# Patient Record
Sex: FEMALE | Race: WHITE | Employment: FULL TIME | URBAN - METROPOLITAN AREA
[De-identification: names, ages, dates, MRNs, and addresses within clinical notes are randomized per-mention and may not be internally consistent; named-entity substitution may affect disease eponyms.]

---

## 2022-07-23 ENCOUNTER — ANESTHESIA (OUTPATIENT)
Dept: OPERATING ROOM | Age: 47
DRG: 560 | End: 2022-07-23
Payer: COMMERCIAL

## 2022-07-23 ENCOUNTER — HOSPITAL ENCOUNTER (INPATIENT)
Age: 47
LOS: 2 days | Discharge: HOME | DRG: 560 | End: 2022-07-25
Attending: ORTHOPAEDIC SURGERY | Admitting: ORTHOPAEDIC SURGERY
Payer: COMMERCIAL

## 2022-07-23 ENCOUNTER — ANESTHESIA EVENT (OUTPATIENT)
Dept: OPERATING ROOM | Age: 47
DRG: 560 | End: 2022-07-23
Payer: COMMERCIAL

## 2022-07-23 ENCOUNTER — APPOINTMENT (OUTPATIENT)
Dept: GENERAL RADIOLOGY | Age: 47
DRG: 560 | End: 2022-07-23
Payer: COMMERCIAL

## 2022-07-23 DIAGNOSIS — M24.351: Primary | ICD-10-CM

## 2022-07-23 PROBLEM — S73.004A HIP DISLOCATION, RIGHT, INITIAL ENCOUNTER (HCC): Status: ACTIVE | Noted: 2022-07-23

## 2022-07-23 LAB
ANION GAP SERPL CALCULATED.3IONS-SCNC: 12 MEQ/L (ref 9–15)
BASOPHILS ABSOLUTE: 0 K/UL (ref 0–0.2)
BASOPHILS RELATIVE PERCENT: 0.1 %
BUN BLDV-MCNC: 13 MG/DL (ref 6–20)
CALCIUM SERPL-MCNC: 8.9 MG/DL (ref 8.5–9.9)
CHLORIDE BLD-SCNC: 103 MEQ/L (ref 95–107)
CO2: 23 MEQ/L (ref 20–31)
CREAT SERPL-MCNC: 0.51 MG/DL (ref 0.5–0.9)
EOSINOPHILS ABSOLUTE: 0 K/UL (ref 0–0.7)
EOSINOPHILS RELATIVE PERCENT: 0 %
GFR AFRICAN AMERICAN: >60
GFR NON-AFRICAN AMERICAN: >60
GLUCOSE BLD-MCNC: 121 MG/DL (ref 70–99)
HCT VFR BLD CALC: 37.2 % (ref 37–47)
HEMOGLOBIN: 12.2 G/DL (ref 12–16)
LYMPHOCYTES ABSOLUTE: 0.6 K/UL (ref 1–4.8)
LYMPHOCYTES RELATIVE PERCENT: 7.8 %
MCH RBC QN AUTO: 30 PG (ref 27–31.3)
MCHC RBC AUTO-ENTMCNC: 32.7 % (ref 33–37)
MCV RBC AUTO: 91.9 FL (ref 82–100)
MONOCYTES ABSOLUTE: 0.1 K/UL (ref 0.2–0.8)
MONOCYTES RELATIVE PERCENT: 1.5 %
NEUTROPHILS ABSOLUTE: 6.5 K/UL (ref 1.4–6.5)
NEUTROPHILS RELATIVE PERCENT: 90.6 %
PDW BLD-RTO: 14.1 % (ref 11.5–14.5)
PLATELET # BLD: 391 K/UL (ref 130–400)
POTASSIUM REFLEX MAGNESIUM: 4.3 MEQ/L (ref 3.4–4.9)
RBC # BLD: 4.05 M/UL (ref 4.2–5.4)
SODIUM BLD-SCNC: 138 MEQ/L (ref 135–144)
WBC # BLD: 7.1 K/UL (ref 4.8–10.8)

## 2022-07-23 PROCEDURE — 6370000000 HC RX 637 (ALT 250 FOR IP): Performed by: STUDENT IN AN ORGANIZED HEALTH CARE EDUCATION/TRAINING PROGRAM

## 2022-07-23 PROCEDURE — G0378 HOSPITAL OBSERVATION PER HR: HCPCS

## 2022-07-23 PROCEDURE — 0QS6XZZ REPOSITION RIGHT UPPER FEMUR, EXTERNAL APPROACH: ICD-10-PCS | Performed by: ORTHOPAEDIC SURGERY

## 2022-07-23 PROCEDURE — 3600000002 HC SURGERY LEVEL 2 BASE: Performed by: ORTHOPAEDIC SURGERY

## 2022-07-23 PROCEDURE — 6360000002 HC RX W HCPCS: Performed by: STUDENT IN AN ORGANIZED HEALTH CARE EDUCATION/TRAINING PROGRAM

## 2022-07-23 PROCEDURE — 80048 BASIC METABOLIC PNL TOTAL CA: CPT

## 2022-07-23 PROCEDURE — 6360000002 HC RX W HCPCS: Performed by: ANESTHESIOLOGY

## 2022-07-23 PROCEDURE — 3700000001 HC ADD 15 MINUTES (ANESTHESIA): Performed by: ORTHOPAEDIC SURGERY

## 2022-07-23 PROCEDURE — 2580000003 HC RX 258: Performed by: ANESTHESIOLOGY

## 2022-07-23 PROCEDURE — 85025 COMPLETE CBC W/AUTO DIFF WBC: CPT

## 2022-07-23 PROCEDURE — 6370000000 HC RX 637 (ALT 250 FOR IP): Performed by: ORTHOPAEDIC SURGERY

## 2022-07-23 PROCEDURE — 7100000000 HC PACU RECOVERY - FIRST 15 MIN: Performed by: ORTHOPAEDIC SURGERY

## 2022-07-23 PROCEDURE — 7100000001 HC PACU RECOVERY - ADDTL 15 MIN: Performed by: ORTHOPAEDIC SURGERY

## 2022-07-23 PROCEDURE — 2500000003 HC RX 250 WO HCPCS: Performed by: STUDENT IN AN ORGANIZED HEALTH CARE EDUCATION/TRAINING PROGRAM

## 2022-07-23 PROCEDURE — 73552 X-RAY EXAM OF FEMUR 2/>: CPT

## 2022-07-23 PROCEDURE — 99285 EMERGENCY DEPT VISIT HI MDM: CPT

## 2022-07-23 PROCEDURE — 36415 COLL VENOUS BLD VENIPUNCTURE: CPT

## 2022-07-23 PROCEDURE — 73501 X-RAY EXAM HIP UNI 1 VIEW: CPT

## 2022-07-23 PROCEDURE — 73502 X-RAY EXAM HIP UNI 2-3 VIEWS: CPT

## 2022-07-23 PROCEDURE — 96372 THER/PROPH/DIAG INJ SC/IM: CPT

## 2022-07-23 PROCEDURE — 3209999900 FLUORO FOR SURGICAL PROCEDURES

## 2022-07-23 PROCEDURE — 3700000000 HC ANESTHESIA ATTENDED CARE: Performed by: ORTHOPAEDIC SURGERY

## 2022-07-23 PROCEDURE — 3600000012 HC SURGERY LEVEL 2 ADDTL 15MIN: Performed by: ORTHOPAEDIC SURGERY

## 2022-07-23 PROCEDURE — 1210000000 HC MED SURG R&B

## 2022-07-23 PROCEDURE — 6360000002 HC RX W HCPCS: Performed by: ORTHOPAEDIC SURGERY

## 2022-07-23 PROCEDURE — 2580000003 HC RX 258: Performed by: ORTHOPAEDIC SURGERY

## 2022-07-23 RX ORDER — METOCLOPRAMIDE HYDROCHLORIDE 5 MG/ML
10 INJECTION INTRAMUSCULAR; INTRAVENOUS
Status: DISCONTINUED | OUTPATIENT
Start: 2022-07-23 | End: 2022-07-23 | Stop reason: HOSPADM

## 2022-07-23 RX ORDER — SUCCINYLCHOLINE CHLORIDE 20 MG/ML
INJECTION INTRAMUSCULAR; INTRAVENOUS PRN
Status: DISCONTINUED | OUTPATIENT
Start: 2022-07-23 | End: 2022-07-23 | Stop reason: SDUPTHER

## 2022-07-23 RX ORDER — FENTANYL CITRATE 50 UG/ML
INJECTION, SOLUTION INTRAMUSCULAR; INTRAVENOUS PRN
Status: DISCONTINUED | OUTPATIENT
Start: 2022-07-23 | End: 2022-07-23 | Stop reason: SDUPTHER

## 2022-07-23 RX ORDER — OXYCODONE HYDROCHLORIDE 5 MG/1
5 TABLET ORAL EVERY 4 HOURS PRN
Status: DISCONTINUED | OUTPATIENT
Start: 2022-07-23 | End: 2022-07-25 | Stop reason: HOSPADM

## 2022-07-23 RX ORDER — SODIUM CHLORIDE, SODIUM LACTATE, POTASSIUM CHLORIDE, CALCIUM CHLORIDE 600; 310; 30; 20 MG/100ML; MG/100ML; MG/100ML; MG/100ML
INJECTION, SOLUTION INTRAVENOUS CONTINUOUS PRN
Status: DISCONTINUED | OUTPATIENT
Start: 2022-07-23 | End: 2022-07-23 | Stop reason: SDUPTHER

## 2022-07-23 RX ORDER — MIDAZOLAM HYDROCHLORIDE 1 MG/ML
INJECTION INTRAMUSCULAR; INTRAVENOUS PRN
Status: DISCONTINUED | OUTPATIENT
Start: 2022-07-23 | End: 2022-07-23 | Stop reason: SDUPTHER

## 2022-07-23 RX ORDER — MORPHINE SULFATE 2 MG/ML
2 INJECTION, SOLUTION INTRAMUSCULAR; INTRAVENOUS
Status: DISCONTINUED | OUTPATIENT
Start: 2022-07-23 | End: 2022-07-25 | Stop reason: HOSPADM

## 2022-07-23 RX ORDER — PREDNISONE 20 MG/1
40 TABLET ORAL ONCE
Status: COMPLETED | OUTPATIENT
Start: 2022-07-23 | End: 2022-07-23

## 2022-07-23 RX ORDER — SODIUM CHLORIDE 0.9 % (FLUSH) 0.9 %
5-40 SYRINGE (ML) INJECTION PRN
Status: DISCONTINUED | OUTPATIENT
Start: 2022-07-23 | End: 2022-07-25 | Stop reason: HOSPADM

## 2022-07-23 RX ORDER — FENTANYL CITRATE 50 UG/ML
25 INJECTION, SOLUTION INTRAMUSCULAR; INTRAVENOUS EVERY 5 MIN PRN
Status: DISCONTINUED | OUTPATIENT
Start: 2022-07-23 | End: 2022-07-23 | Stop reason: HOSPADM

## 2022-07-23 RX ORDER — MORPHINE SULFATE 4 MG/ML
4 INJECTION, SOLUTION INTRAMUSCULAR; INTRAVENOUS
Status: DISCONTINUED | OUTPATIENT
Start: 2022-07-23 | End: 2022-07-25 | Stop reason: HOSPADM

## 2022-07-23 RX ORDER — HYDRALAZINE HYDROCHLORIDE 20 MG/ML
10 INJECTION INTRAMUSCULAR; INTRAVENOUS
Status: DISCONTINUED | OUTPATIENT
Start: 2022-07-23 | End: 2022-07-23 | Stop reason: HOSPADM

## 2022-07-23 RX ORDER — PROPOFOL 10 MG/ML
INJECTION, EMULSION INTRAVENOUS PRN
Status: DISCONTINUED | OUTPATIENT
Start: 2022-07-23 | End: 2022-07-23 | Stop reason: SDUPTHER

## 2022-07-23 RX ORDER — SODIUM CHLORIDE 9 MG/ML
25 INJECTION, SOLUTION INTRAVENOUS PRN
Status: DISCONTINUED | OUTPATIENT
Start: 2022-07-23 | End: 2022-07-23 | Stop reason: HOSPADM

## 2022-07-23 RX ORDER — CYCLOBENZAPRINE HCL 10 MG
TABLET ORAL
COMMUNITY
Start: 2022-06-28

## 2022-07-23 RX ORDER — ONDANSETRON 2 MG/ML
4 INJECTION INTRAMUSCULAR; INTRAVENOUS EVERY 6 HOURS PRN
Status: DISCONTINUED | OUTPATIENT
Start: 2022-07-23 | End: 2022-07-25 | Stop reason: HOSPADM

## 2022-07-23 RX ORDER — ONDANSETRON 4 MG/1
4 TABLET, ORALLY DISINTEGRATING ORAL EVERY 8 HOURS PRN
Status: DISCONTINUED | OUTPATIENT
Start: 2022-07-23 | End: 2022-07-25 | Stop reason: HOSPADM

## 2022-07-23 RX ORDER — SODIUM CHLORIDE, SODIUM LACTATE, POTASSIUM CHLORIDE, CALCIUM CHLORIDE 600; 310; 30; 20 MG/100ML; MG/100ML; MG/100ML; MG/100ML
INJECTION, SOLUTION INTRAVENOUS
Status: COMPLETED
Start: 2022-07-23 | End: 2022-07-23

## 2022-07-23 RX ORDER — LABETALOL HYDROCHLORIDE 5 MG/ML
10 INJECTION, SOLUTION INTRAVENOUS
Status: DISCONTINUED | OUTPATIENT
Start: 2022-07-23 | End: 2022-07-23 | Stop reason: HOSPADM

## 2022-07-23 RX ORDER — KETOROLAC TROMETHAMINE 30 MG/ML
30 INJECTION, SOLUTION INTRAMUSCULAR; INTRAVENOUS ONCE
Status: COMPLETED | OUTPATIENT
Start: 2022-07-23 | End: 2022-07-23

## 2022-07-23 RX ORDER — LIDOCAINE HYDROCHLORIDE 20 MG/ML
INJECTION, SOLUTION INTRAVENOUS PRN
Status: DISCONTINUED | OUTPATIENT
Start: 2022-07-23 | End: 2022-07-23 | Stop reason: SDUPTHER

## 2022-07-23 RX ORDER — SODIUM CHLORIDE 0.9 % (FLUSH) 0.9 %
5-40 SYRINGE (ML) INJECTION EVERY 12 HOURS SCHEDULED
Status: DISCONTINUED | OUTPATIENT
Start: 2022-07-23 | End: 2022-07-25 | Stop reason: HOSPADM

## 2022-07-23 RX ORDER — PANTOPRAZOLE SODIUM 40 MG/1
TABLET, DELAYED RELEASE ORAL
Status: ON HOLD | COMMUNITY
Start: 2022-06-28 | End: 2022-07-23

## 2022-07-23 RX ORDER — DOCUSATE SODIUM 100 MG/1
CAPSULE, LIQUID FILLED ORAL
COMMUNITY
Start: 2022-06-28

## 2022-07-23 RX ORDER — ASPIRIN 81 MG/1
81 TABLET, CHEWABLE ORAL DAILY
COMMUNITY
Start: 2022-06-28

## 2022-07-23 RX ORDER — ETOMIDATE 2 MG/ML
16 INJECTION INTRAVENOUS ONCE
Status: COMPLETED | OUTPATIENT
Start: 2022-07-23 | End: 2022-07-23

## 2022-07-23 RX ORDER — HYDROCHLOROTHIAZIDE 25 MG/1
TABLET ORAL
COMMUNITY
Start: 2022-06-01

## 2022-07-23 RX ORDER — SODIUM CHLORIDE 9 MG/ML
INJECTION, SOLUTION INTRAVENOUS PRN
Status: DISCONTINUED | OUTPATIENT
Start: 2022-07-23 | End: 2022-07-25 | Stop reason: HOSPADM

## 2022-07-23 RX ORDER — SODIUM CHLORIDE 0.9 % (FLUSH) 0.9 %
5-40 SYRINGE (ML) INJECTION PRN
Status: DISCONTINUED | OUTPATIENT
Start: 2022-07-23 | End: 2022-07-23 | Stop reason: HOSPADM

## 2022-07-23 RX ORDER — OXYCODONE HYDROCHLORIDE 5 MG/1
5 TABLET ORAL
Status: DISCONTINUED | OUTPATIENT
Start: 2022-07-23 | End: 2022-07-23 | Stop reason: HOSPADM

## 2022-07-23 RX ORDER — OXYCODONE HYDROCHLORIDE AND ACETAMINOPHEN 5; 325 MG/1; MG/1
1 TABLET ORAL ONCE
Status: COMPLETED | OUTPATIENT
Start: 2022-07-23 | End: 2022-07-23

## 2022-07-23 RX ORDER — SODIUM CHLORIDE 0.9 % (FLUSH) 0.9 %
5-40 SYRINGE (ML) INJECTION EVERY 12 HOURS SCHEDULED
Status: DISCONTINUED | OUTPATIENT
Start: 2022-07-23 | End: 2022-07-23 | Stop reason: HOSPADM

## 2022-07-23 RX ORDER — ONDANSETRON 2 MG/ML
4 INJECTION INTRAMUSCULAR; INTRAVENOUS
Status: DISCONTINUED | OUTPATIENT
Start: 2022-07-23 | End: 2022-07-23 | Stop reason: HOSPADM

## 2022-07-23 RX ADMIN — SODIUM CHLORIDE, POTASSIUM CHLORIDE, SODIUM LACTATE AND CALCIUM CHLORIDE: 600; 310; 30; 20 INJECTION, SOLUTION INTRAVENOUS at 09:29

## 2022-07-23 RX ADMIN — PREDNISONE 40 MG: 20 TABLET ORAL at 04:00

## 2022-07-23 RX ADMIN — PROPOFOL 200 MG: 10 INJECTION, EMULSION INTRAVENOUS at 09:33

## 2022-07-23 RX ADMIN — MIDAZOLAM HYDROCHLORIDE 2 MG: 1 INJECTION, SOLUTION INTRAMUSCULAR; INTRAVENOUS at 09:29

## 2022-07-23 RX ADMIN — SUCCINYLCHOLINE CHLORIDE 160 MG: 20 INJECTION, SOLUTION INTRAMUSCULAR; INTRAVENOUS at 09:33

## 2022-07-23 RX ADMIN — ASPIRIN 325 MG: 325 TABLET, COATED ORAL at 20:16

## 2022-07-23 RX ADMIN — OXYCODONE 5 MG: 5 TABLET ORAL at 16:26

## 2022-07-23 RX ADMIN — KETOROLAC TROMETHAMINE 30 MG: 30 INJECTION, SOLUTION INTRAMUSCULAR; INTRAVENOUS at 03:59

## 2022-07-23 RX ADMIN — SODIUM CHLORIDE, PRESERVATIVE FREE 10 ML: 5 INJECTION INTRAVENOUS at 20:17

## 2022-07-23 RX ADMIN — ONDANSETRON 4 MG: 2 INJECTION INTRAMUSCULAR; INTRAVENOUS at 06:51

## 2022-07-23 RX ADMIN — ETOMIDATE 16 MG: 2 INJECTION, SOLUTION INTRAVENOUS at 06:55

## 2022-07-23 RX ADMIN — OXYCODONE AND ACETAMINOPHEN 1 TABLET: 5; 325 TABLET ORAL at 04:00

## 2022-07-23 RX ADMIN — FENTANYL CITRATE 50 MCG: 50 INJECTION, SOLUTION INTRAMUSCULAR; INTRAVENOUS at 09:29

## 2022-07-23 RX ADMIN — MORPHINE SULFATE 4 MG: 4 INJECTION, SOLUTION INTRAMUSCULAR; INTRAVENOUS at 06:51

## 2022-07-23 RX ADMIN — OXYCODONE 5 MG: 5 TABLET ORAL at 11:11

## 2022-07-23 RX ADMIN — LIDOCAINE HYDROCHLORIDE 60 MG: 20 INJECTION, SOLUTION INTRAVENOUS at 09:33

## 2022-07-23 ASSESSMENT — ENCOUNTER SYMPTOMS
BACK PAIN: 0
SHORTNESS OF BREATH: 0
EYE PAIN: 0
SORE THROAT: 0
CHEST TIGHTNESS: 0
DIARRHEA: 0
NAUSEA: 0

## 2022-07-23 ASSESSMENT — PAIN SCALES - GENERAL
PAINLEVEL_OUTOF10: 10
PAINLEVEL_OUTOF10: 10
PAINLEVEL_OUTOF10: 7
PAINLEVEL_OUTOF10: 7
PAINLEVEL_OUTOF10: 2
PAINLEVEL_OUTOF10: 0
PAINLEVEL_OUTOF10: 0
PAINLEVEL_OUTOF10: 10

## 2022-07-23 ASSESSMENT — PAIN DESCRIPTION - ORIENTATION
ORIENTATION: RIGHT

## 2022-07-23 ASSESSMENT — PAIN DESCRIPTION - ONSET
ONSET: SUDDEN
ONSET: SUDDEN

## 2022-07-23 ASSESSMENT — PAIN DESCRIPTION - LOCATION
LOCATION: HIP
LOCATION: HIP
LOCATION: LEG;HIP
LOCATION: HIP;LEG
LOCATION: HIP

## 2022-07-23 ASSESSMENT — PAIN DESCRIPTION - FREQUENCY
FREQUENCY: CONTINUOUS
FREQUENCY: CONTINUOUS

## 2022-07-23 ASSESSMENT — PAIN DESCRIPTION - PAIN TYPE
TYPE: ACUTE PAIN
TYPE: ACUTE PAIN

## 2022-07-23 ASSESSMENT — PAIN DESCRIPTION - DESCRIPTORS
DESCRIPTORS: ACHING
DESCRIPTORS: SHARP;THROBBING;TINGLING
DESCRIPTORS: ACHING
DESCRIPTORS: ACHING

## 2022-07-23 ASSESSMENT — PAIN - FUNCTIONAL ASSESSMENT
PAIN_FUNCTIONAL_ASSESSMENT: INTOLERABLE, UNABLE TO DO ANY ACTIVE OR PASSIVE ACTIVITIES
PAIN_FUNCTIONAL_ASSESSMENT: 0-10

## 2022-07-23 NOTE — ANESTHESIA POSTPROCEDURE EVALUATION
Department of Anesthesiology  Postprocedure Note    Patient: Vazquez Frederick  MRN: [id number]  [place]: 1975  Date of evaluation: 7/23/2022      Procedure Summary     Date: 07/23/22 Room / Location: [address]    Anesthesia Start: 5327 Anesthesia Stop: 6873    Procedure: HIP CLOSED REDUCTION (Right) Diagnosis:       Closed dislocation of right hip, initial encounter ([facility]      (Closed dislocation of right hip, initial encounter ([facility] Vivian Race)    Surgeons: Olivia Cobb MD Responsible Provider: Juli Alvares MD    Anesthesia Type: General ASA Status: 3          Anesthesia Type: General    Ralf Phase I:      Ralf Phase II:        Anesthesia Post Evaluation    Patient location during evaluation: bedside  Patient participation: complete - patient participated  Level of consciousness: awake and awake and alert  Airway patency: patent  Nausea & Vomiting: no nausea and no vomiting  Complications: no  Cardiovascular status: blood pressure returned to baseline and hemodynamically stable  Respiratory status: acceptable  Hydration status: euvolemic

## 2022-07-23 NOTE — ED NOTES
Pt update don resulted images. Plan of care is discussed. Pt verbalized understanding.       Pauline Griffin RN  07/23/22 3111

## 2022-07-23 NOTE — SEDATION DOCUMENTATION
Pt A/O x4  Resting comfortably in bed  O2 supplementation removed, pt SpO2 95% on RA  Remains on cardiac monitor with cycling BP

## 2022-07-23 NOTE — ED PROVIDER NOTES
[facility] ED  eMERGENCYdEPARTMENT eNCOUnter      Pt Name: Marty Thomas  MRN: [id number]  [place] 1975  Date of evaluation: 2022  Provider:Ko Wray PA-C    CHIEF COMPLAINT           HPI  Marty Thomas is a 51 y.o. female per chart review has a h/o Raynaud's, depression, bipolar, radiculopathy, occipital neuralgia presents with hip pain. Patient is status post right hip replacement 3 weeks ago by Dr. Singh Kingsley at [facility] AT [facility]. She states she was just lying on her floor when she experienced sharp, severe, radiating down the buttock and down the leg hip pain earlier today. Patient denies any acute movements or injuries to the area. She denies fever, chills, urinary symptoms, low back pain. ROS  Review of Systems   Constitutional:  Negative for chills, fatigue and fever. HENT:  Negative for ear pain, hearing loss and sore throat. Eyes:  Negative for pain and visual disturbance. Respiratory:  Negative for chest tightness and shortness of breath. Cardiovascular:  Negative for chest pain. Gastrointestinal:  Negative for diarrhea and nausea. Endocrine: Negative for cold intolerance. Genitourinary:  Negative for hematuria. Musculoskeletal:  Negative for back pain. Right hip pain   Skin:  Negative for rash and wound. Neurological:  Negative for dizziness and headaches. Psychiatric/Behavioral:  Negative for behavioral problems and confusion. Except as noted above the remainder of the review of systems was reviewed and negative.        PAST MEDICAL HISTORY     Past Medical History:   Diagnosis Date    Anxiety     Depression     Migraine headache     Obesity     Raynaud disease     Tobacco abuse          SURGICAL HISTORY       Past Surgical History:   Procedure Laterality Date    CARDIAC CATHETERIZATION      MINIMAL CAD-PRICE-failed stress w CP     SECTION      HYSTERECTOMY (CERVIX STATUS UNKNOWN)  10/31/2011    Dr. Cervantes  VEIN SURGERY  2010    [facility]         CURRENTMEDICATIONS       Previous Medications    ASPIRIN 81 MG CHEWABLE TABLET    Take 81 mg by mouth in the morning. CYCLOBENZAPRINE (FLEXERIL) 10 MG TABLET    Per instructions EVERY 8 HOURS (route: oral)    DOCUSATE SODIUM (COLACE) 100 MG CAPSULE    Per instructions 2 TIMES DAILY (route: oral)    GABAPENTIN (NEURONTIN) 100 MG CAPSULE    Take 1, 2 or 3 capsules at bedtime. Begin with 1 capsule and increase as tolerated. Generic equivalent acceptable, unless otherwise noted. HYDROCHLOROTHIAZIDE (HYDRODIURIL) 25 MG TABLET    Per instructions EVERY DAY (route: oral)    NAPROXEN SODIUM (ALEVE PO)    Take by mouth as needed    OXYCODONE-ACETAMINOPHEN (OXYCODONE-ACETAMINOPHIN 5-325 MG PO TABS, STARTER PACK,)    Per instructions EVERY 4 HOURS (route: oral)    PANTOPRAZOLE (PROTONIX) 40 MG TABLET    Per instructions DAILY (route: oral)    VARENICLINE (CHANTIX CONTINUING MONTH ROSS) 1 MG TABLET    Take 1 tablet by mouth continuous       ALLERGIES     Patient has no known allergies. FAMILY HISTORY       Family History   Problem Relation Age of Onset    Cancer Mother         cervical    Cancer Maternal Aunt         breast    Heart Disease Father           SOCIAL HISTORY       Social History     Socioeconomic History    Marital status: Single     Spouse name: None    Number of children: None    Years of education: None    Highest education level: None   Tobacco Use    Smoking status: Former     Packs/day: 0.50     Years: 19.00     Pack years: 9.50     Types: Cigarettes    Smokeless tobacco: Former   Substance and Sexual Activity    Alcohol use: Yes     Comment: occasional         PHYSICAL EXAM       ED Triage Vitals [07/23/22 0339]   BP Temp Temp Source Heart Rate Resp SpO2 Height Weight   124/89 98.2 °F (36.8 °C) Oral 93 18 98 % 5' 6\" (1.676 m) 250 lb (113.4 kg)       Physical Exam  Constitutional:       Appearance: Normal appearance.    HENT:      Head: Normocephalic and atraumatic. Right Ear: External ear normal.      Left Ear: External ear normal.      Nose: Nose normal.      Mouth/Throat:      Mouth: Mucous membranes are moist.   Eyes:      Extraocular Movements: Extraocular movements intact. Conjunctiva/sclera: Conjunctivae normal.   Cardiovascular:      Rate and Rhythm: Normal rate and regular rhythm. Heart sounds: Normal heart sounds. Pulmonary:      Effort: Pulmonary effort is normal.      Breath sounds: Normal breath sounds. No stridor. No wheezing or rhonchi. Abdominal:      Palpations: Abdomen is soft. Tenderness: There is no abdominal tenderness. Musculoskeletal:         General: Normal range of motion. Cervical back: Normal range of motion and neck supple. No tenderness. Legs:    Skin:     General: Skin is warm and dry. Neurological:      General: No focal deficit present. Mental Status: She is alert and oriented to person, place, and time. Psychiatric:         Mood and Affect: Mood normal.         Behavior: Behavior normal.         MDM  This is a 23-year-old female presenting with hip pain. Patient is afebrile and hemodynamically stable. Patient given IV Toradol, p.o. Percocet, p.o. prednisone. X-ray shows dislocation of ball-and-socket hardware. Attempts at reduction in the emergency department were unsuccessful, Dr. Johana Nickerson was recontacted, was notified of unsuccessful reduction, he will admit, and follow patient for deep sedation and reduction. Conscious Sedation Procedure Note    NPO 6 hours    Indication: hip dislocation    Consent: I have discussed with the patient and/or the patient representative the indication, alternatives, and the possible risks and/or complications of the planned procedure and the anesthesia methods. The patient and/or patient representative appear to understand and agree to proceed.     Physician Involvement: The attending physician was present and supervising this procedure. Pre-Sedation Documentation and Exam: I have personally completed a history, physical exam & review of systems for this patient (see notes). Airway Assessment: normal    Prior History of Anesthesia Complications: none    ASA Classification: Class 1 - A normal healthy patient    Sedation/ Anesthesia Plan: general    Medications Used: etomidate intravenously    Monitoring and Safety: The patient was placed on a cardiac monitor and vital signs, pulse oximetry and level of consciousness were continuously evaluated throughout the procedure. The patient was closely monitored until recovery from the medications was complete and the patient had returned to baseline status. Respiratory therapy was on standby at all times during the procedure. (The following sections must be completed)  Post-Sedation Vital Signs: Vital signs were reviewed and were stable after the procedure (see flow sheet for vitals)            Post-Sedation Exam: Lungs: clear and Cardiovascular: normal           Complications: none              FINAL IMPRESSION      1.  Pathologic dislocation of hip joint, right          DISPOSITION/PLAN   DISPOSITION Admitted 07/23/2022 06:27:21 AM        DISCHARGE MEDICATIONS:  [unfilled]         Gissel Berry PA-C(electronically signed)  Attending Emergency Physician          Gissel Berry PA-C  07/24/22 [address], PA-C  07/30/22 5229

## 2022-07-23 NOTE — ANESTHESIA PRE PROCEDURE
Department of Anesthesiology  Preprocedure Note       Name:  Juan Nava   Age:  51 y.o.  :  1975                                          MRN:  [id number]         Date:  2022      Surgeon: Anthony Vallejo):  Mynor Gabriel MD    Procedure: Procedure(s):  HIP CLOSED REDUCTION    Medications prior to admission:   Prior to Admission medications    Medication Sig Start Date End Date Taking? Authorizing Provider   aspirin 81 MG chewable tablet Take 81 mg by mouth in the morning. 22  Yes Historical Provider, MD   cyclobenzaprine (FLEXERIL) 10 MG tablet Per instructions EVERY 8 HOURS (route: oral) 22  Yes Historical Provider, MD   docusate sodium (COLACE) 100 MG capsule Per instructions 2 TIMES DAILY (route: oral) 22  Yes Historical Provider, MD   hydroCHLOROthiazide (HYDRODIURIL) 25 MG tablet Per instructions EVERY DAY (route: oral) 22  Yes Historical Provider, MD   pantoprazole (PROTONIX) 40 MG tablet Per instructions DAILY (route: oral) 22  Yes Historical Provider, MD   oxyCODONE-Acetaminophen (OXYCODONE-ACETAMINOPHIN 5-325 MG PO TABS, STARTER PACK,) Per instructions EVERY 4 HOURS (route: oral) 22  Yes Historical Provider, MD   gabapentin (NEURONTIN) 100 MG capsule Take 1, 2 or 3 capsules at bedtime. Begin with 1 capsule and increase as tolerated. Generic equivalent acceptable, unless otherwise noted.   Patient not taking: Reported on 2022   Magdalena Watson DO   Naproxen Sodium (ALEVE PO) Take by mouth as needed  Patient not taking: Reported on 2022    Historical Provider, MD   varenicline (CHANTIX CONTINUING MONTH ROSS) 1 MG tablet Take 1 tablet by mouth continuous  Patient not taking: Reported on 2022   Dae Joe MD       Current medications:    Current Facility-Administered Medications   Medication Dose Route Frequency Provider Last Rate Last Admin    oxyCODONE (ROXICODONE) immediate release tablet 5 mg  5 mg Oral Q4H PRN Mendel Fuelling Piedad Dalton MD        morphine sulfate (PF) injection 4 mg  4 mg IntraVENous Q2H PRN Mariel Dhillon MD   4 mg at 22 9633    ondansetron (ZOFRAN-ODT) disintegrating tablet 4 mg  4 mg Oral Q8H PRN Mariel Dhillon MD        Or    [facility]etron [facility]) injection 4 mg  4 mg IntraVENous Q6H PRN Mariel Dhillon MD   4 mg at 22 7624    magnesium hydroxide (MILK OF MAGNESIA) 400 MG/5ML suspension 30 mL  30 mL Oral Daily PRN Mariel Dhillon MD           Allergies:  No Known Allergies    Problem List:    Patient Active Problem List   Diagnosis Code    Migraine headache G43.909    Raynaud disease I73.00    Depression F32. A    Bipolar affective disorder ([facility] F31.9    Obesity (BMI 30-39. 9) E66.9    Smoker unmotivated to quit F17.200    Pain in both lower extremities M79.604, M79.605    Numbness of foot R20.0    Neck pain M54.2    Bilateral low back pain M54.50    Smoker F17.200    Lumbosacral radiculopathy at L5 M54.17    Occipital neuralgia of left side M54.81    Insomnia secondary to chronic pain G89.29, G47.01    Lumbosacral radiculopathy at S1 M54.17    Weakness of lower extremity R29.898    Neuropathy of lower extremity G57.90    Fibromyalgia muscle pain M79.7    Greater trochanteric bursitis of both hips M70.61, M70.62    Hip dislocation, right, initial encounter ([facility] S73.004A       Past Medical History:        Diagnosis Date    Anxiety     Depression     Migraine headache     Obesity     Raynaud disease     Tobacco abuse        Past Surgical History:        Procedure Laterality Date    CARDIAC CATHETERIZATION      MINIMAL CAD-PRICE-failed stress w CP     SECTION      HYSTERECTOMY (CERVIX STATUS UNKNOWN)  10/31/2011    Dr. Jing Burgess      [facility]       Social History:    Social History     Tobacco Use    Smoking status: Former     Packs/day: 0.50     Years: 19.00     Pack years: 9.50     Types: Cigarettes    Smokeless tobacco: Former   Substance Use Topics    Alcohol use: Yes     Comment: occasional                                Counseling given: Not Answered      Vital Signs (Current):   Vitals:    07/23/22 0712 07/23/22 0720 07/23/22 0725 07/23/22 0730   BP: 132/86 125/80 (!) 137/95 (!) 137/94   Pulse: 83 90 88 84   Resp: 21 19 19 17   Temp: 98.5 °F (36.9 °C)      TempSrc:       SpO2: 96% 96% 97% 98%   Weight:       Height:                                                  BP Readings from Last 3 Encounters:   07/23/22 (!) 137/94   03/21/16 116/76   02/03/16 108/66       NPO Status: Time of last liquid consumption: 0400                        Time of last solid consumption: 1700                        Date of last liquid consumption: 07/23/22                        Date of last solid food consumption: 07/22/22    BMI:   Wt Readings from Last 3 Encounters:   07/23/22 250 lb (113.4 kg)   03/21/16 233 lb (105.7 kg)   02/20/16 230 lb (104.3 kg)     Body mass index is 40.35 kg/m². CBC:   Lab Results   Component Value Date/Time    WBC 8.1 01/22/2016 10:38 AM    RBC 5.06 01/22/2016 10:38 AM    HGB 14.9 01/22/2016 10:38 AM    HCT 46.5 01/22/2016 10:38 AM    MCV 92.0 01/22/2016 10:38 AM    RDW 14.0 01/22/2016 10:38 AM     01/22/2016 10:38 AM       CMP:   Lab Results   Component Value Date/Time     01/22/2016 10:38 AM    K 4.2 01/22/2016 10:38 AM     01/22/2016 10:38 AM    CO2 24 01/22/2016 10:38 AM    BUN 10 01/22/2016 10:38 AM    CREATININE 0.51 01/22/2016 10:38 AM    GFRAA >60.0 01/22/2016 10:38 AM    LABGLOM >60.0 01/22/2016 10:38 AM    GLUCOSE 87 01/22/2016 10:38 AM    PROT 7.2 01/22/2016 10:38 AM    CALCIUM 9.1 01/22/2016 10:38 AM    BILITOT 0.5 01/22/2016 10:38 AM    ALKPHOS 59 01/22/2016 10:38 AM    AST 15 01/22/2016 10:38 AM    ALT 11 01/22/2016 10:38 AM       POC Tests: No results for input(s): POCGLU, POCNA, POCK, POCCL, POCBUN, POCHEMO, POCHCT in the last 72 hours.     Coags: No results found for: PROTIME, INR, APTT    HCG (If Applicable): No results found for: PREGTESTUR, PREGSERUM, HCG, HCGQUANT     ABGs: No results found for: PHART, PO2ART, WYZ3ORE, BOS4DXZ, BEART, S2KYEXWL     Type & Screen (If Applicable):  No results found for: LABABO, LABRH    Drug/Infectious Status (If Applicable):  No results found for: HIV, HEPCAB    COVID-19 Screening (If Applicable): No results found for: COVID19        Anesthesia Evaluation  Patient summary reviewed and Nursing notes reviewed no history of anesthetic complications:   Airway: Mallampati: II  TM distance: >3 FB   Neck ROM: full  Mouth opening: > = 3 FB   Dental: normal exam         Pulmonary:Negative Pulmonary ROS and normal exam                               Cardiovascular:Negative CV ROS  Exercise tolerance: good (>4 METS),            Beta Blocker:  Not on Beta Blocker         Neuro/Psych:   Negative Neuro/Psych ROS  (+) neuromuscular disease:, headaches:, psychiatric history:            GI/Hepatic/Renal: Neg GI/Hepatic/Renal ROS  (+) morbid obesity          Endo/Other: Negative Endo/Other ROS             Pt had PAT visit. Abdominal:             Vascular: negative vascular ROS. Other Findings:           Anesthesia Plan      general     ASA 3       Induction: intravenous. MIPS: Postoperative opioids intended and Prophylactic antiemetics administered. Anesthetic plan and risks discussed with patient.       Plan discussed with surgical team.    Attending anesthesiologist reviewed and agrees with Pre Eval content                Mariah Hubbard MD   7/23/2022

## 2022-07-23 NOTE — H&P
[address]                      [facility], [address] [facility]                              HISTORY AND PHYSICAL    PATIENT NAME: Amy Kuo                      :        1975  MED REC NO:   [id number]                            ROOM:  ACCOUNT NO:   [de-identified]                           ADMIT DATE: 2022  PROVIDER:     Kristen Evans MD    CHIEF COMPLAINT:  Right hip pain. HISTORY OF PRESENT ILLNESS:  The patient is a 54-year-old female,  three-week status post right total hip arthroplasty by Dr. Harriett Abebe at  [facility]. She was _____ acute onset of pain in the leg. She does not recall  any discrete pop. She was unable to ambulate and was brought to the ER. She was found to have a dislocated total hip arthroplasty. Attempted  reduction in the ER was unsuccessful by the ER staff. She is now being  admitted for potential operative stabilization. PAST MEDICAL HISTORY:  Multiple. MEDICATIONS:  Multiple. ALLERGIES:  No known drug allergies. Please refer to the intake H and P regarding the patient's review of  systems, family history, and social history as was done today. PHYSICAL EXAMINATION:  HEENT:  Normal.  LUNGS:  Clear. HEART:  Regular. ABDOMEN:  Soft and nontender. SKIN:  Exam is normal.  NEUROLOGICAL:  She is intact. EXTREMITIES:  Her right hip incision is healing well without sign of  significant redness or warmth. She has a slightly rotated right leg  with pain during any hip motion. She is able to move the foot and toes  well. No evidence of other extremity trauma. DIAGNOSTIC STUDIES:  Radiographs; AP and lateral views of the right hip  show a superiorly dislocated total hip arthroplasty. No obvious  loosening to the components. IMPRESSION:  Dislocated right total hip arthroplasty. PLAN:  The need for closed reduction was discussed at length with the  patient.   Specific risks were noted including infection,

## 2022-07-23 NOTE — CARE COORDINATION
[facility] AT [place] Case Management Initial Discharge Assessment    Met with Patient and Family to discuss discharge plan. PCP: DR. Allison [facility]                           Date of Last Visit: 1 MONTH AGO--BEFORE HIP SX    [facility] Patient: No        [facility] Notified: no    If no PCP, list provided? N/A    Discharge Planning    Living Arrangements: independently at home    Who do you live with? SON    Who helps you with your care:  self or family    If lives at home:     Do you have any barriers navigating in your home? yes - MULTI LEVEL HOME--STEPS TO EVERY LEVEL. Patient can perform ADL? Yes    Current Services (outpatient and in home) : Outpatient PT/OT ([address])    Dialysis: No    Is transportation available to get to your appointments? Yes--PATIENT WAS DRIVING HERSELF TO THERAPY, IF UNABLE TO DRIVE AFTER THIS ADMISSION THE PATIENT WILL HAVE HER MOM TRANSPORT IF NEEDED    DME Equipment:  yes - HAS WALKERS, CANE, CRUTCHES, RAISED TOILETS, HIP KIT    Respiratory equipment: None    Respiratory provider:  no     Pharmacy:  yes - [facility] [address] with Medication Assistance Program?  No      Patient agreeable to [place] ? Yes, [address] [place] IF INDICATED    Patient agreeable to SNF/Rehab? No and Declined    Other discharge needs identified? Other PATIENT WOULD PREFER TO DO OUTPATIENT THERAPY IF SHE IS ABLE--BUT IS AGREEABLE TO C IF NECESSARY. PATIENT STATED THE  MENTIONED A HIP BRACE--PATIENT WILL NEED THIS BRACE FITTED PRIOR TO D/C. Does Patient Have a High-Risk for Readmission Diagnosis (CHF, PN, MI, COPD)? No    Initial Discharge Plan? (Note: please see concurrent daily documentation for any updates after initial note). PATIENT PLANS TO GO HOME AND DO OUTPATIENT THERAPY. MOM CAN HELP TRANSPORT IF PATIENT IS UNABLE TO DRIVE. PATIENT IS AGREEABLE TO [facility] IF THE PATIENT NEEDS HHC AT D/C.  PATIENT STATED SHE WILL NEED A HIP BRACE PRIOR TO D/C--AWAITING ORDERS FOR BRACE. CM TO FOLLOW.      Readmission Risk              Risk of Unplanned Readmission:  [id number]         Electronically signed by Sarah Chino RN on 7/23/2022 at 11:45 AM

## 2022-07-23 NOTE — ED NOTES
Report given to RN on 2W. Pt is stable at this time. Will continue to monitor for 1 hour post return to baseline. Can be put in for transport at [address] [place] [address].       Zandra Carias, DANNIELLE  07/23/22 2324

## 2022-07-23 NOTE — ED TRIAGE NOTES
Pt presents to ED via EMS for c/o R hip pain s/p replacement surgery 3 weeks ago. Pt states she was sitting on the floor with legs extended and used her arms to brace herself to lean forward and felt sudden sharp pain shoot from R hip down RLE followed by tingling and throbbing. Pain originates in low back/buttocks and runs down anterior aspect of RLE. Surgical site is healing, well approximated, without drainage, and not tender to touch.

## 2022-07-24 PROCEDURE — G0378 HOSPITAL OBSERVATION PER HR: HCPCS

## 2022-07-24 PROCEDURE — 2580000003 HC RX 258: Performed by: ORTHOPAEDIC SURGERY

## 2022-07-24 PROCEDURE — 6370000000 HC RX 637 (ALT 250 FOR IP): Performed by: ORTHOPAEDIC SURGERY

## 2022-07-24 PROCEDURE — 2700000000 HC OXYGEN THERAPY PER DAY

## 2022-07-24 PROCEDURE — 1210000000 HC MED SURG R&B

## 2022-07-24 RX ADMIN — OXYCODONE 5 MG: 5 TABLET ORAL at 14:28

## 2022-07-24 RX ADMIN — SODIUM CHLORIDE, PRESERVATIVE FREE 10 ML: 5 INJECTION INTRAVENOUS at 14:23

## 2022-07-24 RX ADMIN — ASPIRIN 325 MG: 325 TABLET, COATED ORAL at 22:14

## 2022-07-24 RX ADMIN — OXYCODONE 5 MG: 5 TABLET ORAL at 05:18

## 2022-07-24 RX ADMIN — ASPIRIN 325 MG: 325 TABLET, COATED ORAL at 07:43

## 2022-07-24 RX ADMIN — OXYCODONE 5 MG: 5 TABLET ORAL at 22:14

## 2022-07-24 ASSESSMENT — PAIN DESCRIPTION - DESCRIPTORS
DESCRIPTORS: ACHING
DESCRIPTORS: THROBBING

## 2022-07-24 ASSESSMENT — PAIN SCALES - GENERAL
PAINLEVEL_OUTOF10: 7
PAINLEVEL_OUTOF10: 6
PAINLEVEL_OUTOF10: 1
PAINLEVEL_OUTOF10: 6

## 2022-07-24 ASSESSMENT — PAIN DESCRIPTION - ORIENTATION
ORIENTATION: RIGHT
ORIENTATION: RIGHT

## 2022-07-24 ASSESSMENT — PAIN DESCRIPTION - LOCATION
LOCATION: HIP
LOCATION: HIP

## 2022-07-24 NOTE — PROGRESS NOTES
DAILY PROGRESS NOTE      POD: 1    Patient doing well  Vitals:    22 0715   BP: 102/62   Pulse: 73   Resp: 18   Temp: 98.4 °F (36.9 °C)   SpO2:       Temp (24hrs), Av.5 °F (36.9 °C), Min:98.4 °F (36.9 °C), Max:98.6 °F (37 °C)       Pain Control Good  No chest pain or shortness of breath. No calf pain    Exam:   Incision(s): clean  Dressing clean, dry, and intact  Operative extremity shows neuro vascular status intact. Flexion and extension intact on operative extremity  Calves soft and non-tender without evidence of DVT. .      Labs reviewed:  CBC:   Recent Labs     22  1043   WBC 7.1   HGB 12.2        BMP:    Recent Labs     22  0830      K 4.3      CO2 23   BUN 13   CREATININE 0.51   GLUCOSE 121*       I&O  I/O last 3 completed shifts:  In: -   Out: 600 [Urine:600]      Assessment:  Good Post Operative Course.     Plan:  POD1 s/p reduction of R GIAN dislocation  May be OOB with pillow between legs, hip precautions, wbat  Brace ordered, can d/c home once brace in place  F/u with Dr. Laurita Blair 7-10 d    Alexey Mcdonald MD MD  2022 9:54 AM

## 2022-07-24 NOTE — PROGRESS NOTES
Circulation check to right lower extremity intact. Vitals obtained. Pt rates pain at 2/10. HS snack given. Denies needs. Call light in reach. Bed alarm on.

## 2022-07-24 NOTE — CARE COORDINATION
ORDER FOR RT HIP ABDUCTOR BRACE FAXED TO [facility]. THEY ARE CLOSED THIS WEEKEND. WILL NEED TO FOLLOW UP ON Monday AM TO VERIFY THEY RECEIVED THE ORDERS AND CAN COME FIT THE PATIENT FOR DISCHARGE. PATIENT WILL NEED BRACE FOR D/C. ALSO, PATIENT HAS [employer] INSURANCE. WILL NEED TO VERIFY DME COMPANY IS IN NETWORK. UNABLE TO VERIFY AS INSURANCE COMPANY IS CLOSED OVER THE WEEKEND. DME ORDERS AND PATIENT FORMS HAVE BEEN SIGNED AND FAXED--PLACED FORMS ON CHART. PATIENT STILL PLANS FOR D/C HOME W/ OUTPATIENT THERAPY VS C. PATIENT WILL NEED TO SEE HOW SHE AMBULATE WHEN BRACE IS PLACED. CM TO FOLLOW.

## 2022-07-25 VITALS
HEIGHT: 66 IN | WEIGHT: 250 LBS | SYSTOLIC BLOOD PRESSURE: 124 MMHG | TEMPERATURE: 98.4 F | RESPIRATION RATE: 18 BRPM | HEART RATE: 81 BPM | BODY MASS INDEX: 40.18 KG/M2 | OXYGEN SATURATION: 96 % | DIASTOLIC BLOOD PRESSURE: 70 MMHG

## 2022-07-25 PROCEDURE — 6370000000 HC RX 637 (ALT 250 FOR IP): Performed by: ORTHOPAEDIC SURGERY

## 2022-07-25 PROCEDURE — G0378 HOSPITAL OBSERVATION PER HR: HCPCS

## 2022-07-25 RX ADMIN — ASPIRIN 325 MG: 325 TABLET, COATED ORAL at 10:49

## 2022-07-25 NOTE — PROGRESS NOTES
Department of Orthopedic Surgery  Attending Progress Note      SUBJECTIVE patient is up in her chair with her brace on. She is comfortable. She is anticipating her discharge. OBJECTIVE    Physical    VITALS:  /70   Pulse 81   Temp 98.4 °F (36.9 °C) (Oral)   Resp 18   Ht 5' 6\" (1.676 m)   Wt 250 lb (113.4 kg)   LMP  (LMP Unknown)   SpO2 96%   BMI 40.35 kg/m²   24HR INTAKE/OUTPUT:  No intake or output data in the 24 hours ending 07/25/22 1534  The brace is on her right hip. She has intact ankle dorsiflexion and plantarflexion on the right. She has intact knee extension.     Data    CBC:   Lab Results   Component Value Date/Time    WBC 7.1 07/23/2022 10:43 AM    RBC 4.05 07/23/2022 10:43 AM    HGB 12.2 07/23/2022 10:43 AM    HCT 37.2 07/23/2022 10:43 AM    MCV 91.9 07/23/2022 10:43 AM    MCH 30.0 07/23/2022 10:43 AM    MCHC 32.7 07/23/2022 10:43 AM    RDW 14.1 07/23/2022 10:43 AM     07/23/2022 10:43 AM     Current Inpatient Medications    Current Facility-Administered Medications: oxyCODONE (ROXICODONE) immediate release tablet 5 mg, 5 mg, Oral, Q4H PRN  morphine sulfate (PF) injection 4 mg, 4 mg, IntraVENous, Q2H PRN  ondansetron (ZOFRAN-ODT) disintegrating tablet 4 mg, 4 mg, Oral, Q8H PRN **OR** ondansetron (ZOFRAN) injection 4 mg, 4 mg, IntraVENous, Q6H PRN  magnesium hydroxide (MILK OF MAGNESIA) 400 MG/5ML suspension 30 mL, 30 mL, Oral, Daily PRN  sodium chloride flush 0.9 % injection 5-40 mL, 5-40 mL, IntraVENous, 2 times per day  sodium chloride flush 0.9 % injection 5-40 mL, 5-40 mL, IntraVENous, PRN  0.9 % sodium chloride infusion, , IntraVENous, PRN  oxyCODONE (ROXICODONE) immediate release tablet 5 mg, 5 mg, Oral, Q4H PRN  morphine (PF) injection 2 mg, 2 mg, IntraVENous, Q2H PRN **OR** morphine sulfate (PF) injection 4 mg, 4 mg, IntraVENous, Q2H PRN  ondansetron (ZOFRAN-ODT) disintegrating tablet 4 mg, 4 mg, Oral, Q8H PRN **OR** ondansetron (ZOFRAN) injection 4 mg, 4 mg, IntraVENous, Q6H PRN  aspirin EC tablet 325 mg, 325 mg, Oral, BID    ASSESSMENT AND PLAN      1-status post dislocated right total hip arthroplasty with closed reduction. Discharge home today with brace. All questions were answered. Follow-up with Dr. Heather Au in 1 week.

## 2022-07-25 NOTE — DISCHARGE INSTRUCTIONS
Hip Replacement (Posterior) Precautions: What to Expect at Home  Your Recovery  Hip replacement surgery replaces the worn parts of your hip joint. You will need to be careful to protect your new joint after hip replacement surgery. Along with doing your physical therapy exercises, there are many things you can do to help your hip heal. Your recovery may be faster if youfollow these precautions. Try to keep your hip within the safe positions while it heals. Some leg and foot movements may increase the risk of dislocating your hip. Try to avoidthose positions. How can you care for yourself at home? What are some precautions for self-care after hip replacement surgery (posterior)? Keep your toes pointing forward or slightly out. Don't rotate your leg too far to the inside. Do not bend your hip more than 90 degrees. Keep your knees apart. Don't cross your legs. Hip Replacement (Posterior) Precautions: Don't bend your hip too far    Don't lean forward while you sit down or stand up, and don't bend past 90 degrees (like the angle in a letter \"L\"). This means you can't try to  something off the floor or bend down to tie your shoes. Don't lift your knee higher than your hip. Don't sit on low chairs, beds, or toilets. You may want to use a raised toilet seat for a while. Sit in chairs with arms. Hip Replacement (Posterior) Precautions: Don't cross your legs    Imagine there's a line running down the middle of your body. Keep your legs from crossing over it. When you get into a car, back up to the seat of the car, and then sit and slide across the seat toward the middle of the car with your knees about 12 inches apart. A plastic bag on the seat can help you slide in and out of the car. Don't cross your legs when you sit. Don't cross your ankles while lying down. It may help to keep a pillow between your knees when you're in bed. Other tips  Go slowly when you climb stairs.  Make sure the lights are on. Have someone watch you, if you can. When you climb stairs:  Step up first with your unaffected leg. Then bring the affected leg up to the same step. Bring your crutches or cane up. To go down stairs, reverse the order. First, put your crutches or cane on the lower step. Then bring the affected leg down to that step. Finally, step down with the unaffected leg. You can ride in a car, but stop at least once every hour to get out and walk around. You may want to sleep on your back. Don't reach down too far to pull up blankets when you lie in bed. If your doctor recommends exercises, do them as directed. You can cut back on your exercises if your muscles start to ache, but don't stop doing them. Follow-up care is a key part of your treatment and safety. Be sure to make and go to all appointments, and call your doctor if you are having problems. It's also a good idea to know your test results and keep alist of the medicines you take. When should you call for help? Call 911 anytime you think you may need emergency care. For example, call if:    You passed out (lost consciousness). You have sudden chest pain and shortness of breath, or you cough up blood. You have severe pain in your chest.   Call your doctor now or seek immediate medical care if:    You have signs that your hip may be dislocated, including:  Severe pain and not being able to stand. A crooked leg that looks like your hip is out of position. Not being able to bend or straighten your leg. Your leg or foot turns cold or changes color. You have tingling, weakness, or numbness in your leg or foot. You have signs of a blood clot, such as:  Pain in your calf, back of the knee, thigh, or groin. Redness and swelling in your leg or groin. You have pain that does not get better after you take pain medicine. Your incision opens and starts to bleed, or there's more bleeding.      You have signs of infection, such as:  Increased pain, swelling, warmth, or redness. Red streaks leading from the incision. Pus draining from the incision. A fever. Watch closely for changes in your health, and be sure to contact your doctor if:    You do not have a bowel movement after taking a laxative. You do not get better as expected. Where can you learn more? Go to https://chpepiceweb.[employer]. org and sign in to your [employer] account. Enter E097 in the [employer] box to learn more about \"Hip Replacement (Posterior) Precautions: What to Expect at Home. \"     If you do not have an account, please click on the \"Sign Up Now\" link. Current as of: March 9, 2022               Content Version: 13.3  © 2006-2022 [employer]. Care instructions adapted under license by [facility]. If you have questions about a medical condition or this instruction, always ask your healthcare professional. [place] [address] any warranty or liability for your use of this information. Dislocated Hip: Care Instructions  Your Care Instructions     Your hip is a large and fairly stable joint. Normally it takes a hard fall, a car accident, or something else of great force to make the thighbone slip out of its socket (dislocate). But if you have had hip replacement surgery, your hip can more easily slip out of position. This is more common during the firstfew months after the surgery. After your doctor puts your dislocated hip back into normal position, you will need to use a walking aid or hip brace for several weeks or months while the hip heals. You will need to follow special hip precautions to avoid dislocating your hip again. Your doctor may recommend exercises to strengthen the hip jointand your legs. Rest and home treatment can help you heal.  If your hip becomes dislocated again, contact your doctor. You will need to goto a hospital or clinic to have your hip put back in position.   You may have had a sedative to help you relax. You may be unsteady after having sedation. It can take a few hours for the medicine's effects to wear off. Common side effects of sedation include nausea, vomiting, and feeling sleepy ortired. The doctor has checked you carefully, but problems can develop later. If you notice any problems or new symptoms, get medical treatment right away. Follow-up care is a key part of your treatment and safety. Be sure to make and go to all appointments, and call your doctor if you are having problems. It's also a good idea to know your test results and keep alist of the medicines you take. How can you care for yourself at home? If the doctor gave you a sedative: For 24 hours, don't do anything that requires attention to detail. This includes going to work, making important decisions, or signing any legal documents. It takes time for the medicine's effects to completely wear off. For your safety, do not drive or operate any machinery that could be dangerous. Wait until the medicine wears off and you can think clearly and react easily. Your doctor will give you safety precautions to keep your hip centered in its socket during the healing period. Be sure to follow these precautions. Keep your knees and toes pointed forward when you sit in a chair, walk, or stand. Do not sit with your legs crossed. Do not bend at the waist more than 90º. Be careful when leaning or when moving in bed to keep your legs as straight ahead as possible. If you have a hip brace, wear it as directed. Do not remove it unless your doctor says you can. If you remove the brace to shower, be extremely careful. Follow hip precautions to limit hip movement. Rest your hip as much as you can. You will need to change your activities to avoid movements that irritate the hip. If your hip is swollen, put ice or a cold pack on it for 10 to 20 minutes at a time.  Try to do this every 1 to 2 hours for the next 3 days (when you are awake) or until the swelling goes down. Put a thin cloth between the ice and your skin. Take your medicines exactly as prescribed. Call your doctor if you think you are having a problem with your medicine. Ask your doctor if you can take an over-the-counter pain medicine, such as acetaminophen (Tylenol), ibuprofen (Advil, Motrin), or naproxen (Aleve). Be safe with medicines. Read and follow all instructions on the label. If your doctor recommends exercises, do them as directed. When should you call for help? Call 911 anytime you think you may need emergency care. For example, call if:    You have chest pain, are short of breath, or cough up blood. You have trouble breathing. You passed out (lost consciousness). Call your doctor now or seek immediate medical care if:    You have new or worse nausea or vomiting. You have new or worse pain. Your foot is cool or pale or changes color. You have tingling, weakness, or numbness in your foot or toes. You have signs of a blood clot in your leg (called a deep vein thrombosis), such as:  Pain in your calf, back of the knee, thigh, or groin. Redness or swelling in your leg. Watch closely for changes in your health, and be sure to contact your doctor if:    You do not get better as expected. Where can you learn more? Go to https://Wiggiopedenishaeweb.[employer]. org and sign in to your [employer] account. Enter Z671 in the [facility] box to learn more about \"Dislocated Hip: Care Instructions. \"     If you do not have an account, please click on the \"Sign Up Now\" link. Current as of: March 9, 2022               Content Version: 13.3  © 3468-9588 Healthwise, Incorporated. Care instructions adapted under license by [facility]  If you have questions about a medical condition or this instruction, always ask your healthcare professional. [address] any warranty or liability for your use of this information. Surgery to Repair a Hip Fracture: What to Expect at Home  Your Recovery     Surgery for a hip fracture repairs a broken hip bone. When you leave the hospital after surgery, you will probably be walking with crutches or a walker. You may be able to climb a few stairs and get in and out of bed and chairs. But you will need someone to help you at home for the next few weeks or until you have more energy and can move around better. If there is no one to help you athome, you may go to a rehabilitation center or long-term care center. You will go home with a bandage and stitches or staples. You can remove the bandage when your doctor tells you to. Your doctor will remove your stitches or staples 10 days to 3 weeks after your surgery. You may still have some mild pain, and the area may be swollen for 3 to 4 months after surgery. Your doctorwill give you medicine for the pain. You will continue the rehabilitation program (rehab) you started in the hospital. The better you do with your rehab exercises, the quicker you will get your strength and movement back. Most people are able to return to work 4 weeks to 4 months after surgery. But it may take 6 months to 1 year for you to fully recover. Some people, especially older people, are never able to move quite aswell as they used to. You heal best when you take good care of yourself. Eat a variety of healthyfoods, and don't smoke. This care sheet gives you a general idea about how long it will take for you to recover. But each person recovers at a different pace. Follow the steps belowto get better as quickly as possible. How can you care for yourself at home? Activity    Rest when you feel tired. You may take a nap, but don't stay in bed all day. Work with your physical therapist to learn the best way to exercise. You may be able to take frequent, short walks using crutches or a walker.  You will probably have to use crutches or a walker for at least 4 to 6 weeks. After that, you may need to use a cane to help you walk. Do not sit for longer than 30 to 45 minutes at a time. When you sit, use chairs with arms, and don't sit in low chairs. Sleep on your back with your legs slightly apart or on your side with a pillow between your knees for about 6 weeks or as your doctor tells you. Don't sleep on your stomach or affected hip. You may need to take sponge baths until your stitches or staples have been removed. You will probably be able to shower 24 hours after they are removed. Ask your doctor when it is okay to bathe or shower. Ask your doctor when you can drive again. Most people are able to return to work 4 weeks to 4 months after surgery. Ask your doctor when it is okay for you to have sex. Do not lift anything that would make you strain. This may include heavy grocery bags and milk containers, a heavy briefcase or backpack, cat litter or dog food bags, a vacuum , or a child. Diet    By the time you leave the hospital, you will probably be eating your normal diet. If your stomach is upset, try bland, low-fat foods like plain rice, broiled chicken, toast, and yogurt. Your doctor may recommend that you take iron and vitamin supplements. Continue to drink plenty of fluids. Eat healthy foods, and watch your portion sizes. Try to stay at your ideal weight. Too much weight puts more stress on your hip joint. You may notice that your bowel movements are not regular right after your surgery. This is common. Try to avoid constipation and straining with bowel movements. You may want to take a fiber supplement every day. If you have not had a bowel movement after a couple of days, ask your doctor about taking a mild laxative. Your doctor may want you to take calcium supplements and eat foods high in calcium, such as milk, cheese, ice cream, and salmon with bones. These help stop bone loss.  Orange juice and soy milk with added calcium are also good choices. Medicines    Your doctor will tell you if and when you can restart your medicines. You will also be given instructions about taking any new medicines. If you take aspirin or some other blood thinner, ask your doctor if and when to start taking it again. Make sure that you understand exactly what your doctor wants you to do. Your doctor may give you a blood-thinning medicine to prevent blood clots. If you take a blood thinner, be sure you get instructions about how to take your medicine safely. Blood thinners can cause serious bleeding problems. This medicine could be in pill form or as a shot (injection). If a shot is necessary, your doctor will tell you how to do this. Be safe with medicines. Take pain medicines exactly as directed. If the doctor gave you a prescription medicine for pain, take it as prescribed. If you are not taking a prescription pain medicine, ask your doctor if you can take an over-the-counter medicine. If you think your pain medicine is making you sick to your stomach: Take your medicine after meals (unless your doctor has told you not to). Ask your doctor for a different pain medicine. If your doctor prescribed antibiotics, take them as directed. Do not stop taking them just because you feel better. You need to take the full course of antibiotics. Your doctor may also prescribe medicines or calcium supplements to make your bones stronger. Incision care    You will have a bandage over the cut (incision) and staples or stitches. If there is no drainage, most doctors will let you take the bandage off in a few days. Your doctor will remove the staples or stitches 10 days to 3 weeks after the surgery and replace them with strips of tape. Leave the tape on for a week or until it falls off. Exercise    Your rehab program will include a number of exercises to do. Always do them as your therapist tells you.      Do not do any vigorous exercise for 12 weeks or until your doctor tells you it is okay. Ice and elevation    For pain, put ice or a cold pack on the area for 10 to 20 minutes at a time. Put a thin cloth between the ice and your skin. Your ankle may swell for about 3 months. Prop up your ankle when you ice it or anytime you sit or lie down. Try to keep it above the level of your heart. This will help reduce swelling. Other instructions    Continue to wear your support stockings as your doctor says. These help to prevent blood clots. The length of time that you will have to wear them depends on your activity level and the amount of swelling you have. Most people wear these stockings for 4 to 6 weeks after surgery. Follow these tips to prevent falls:  Arrange furniture so that you won't trip on it. Get rid of throw rugs, and move electrical cords out of the way. Walk only in areas with plenty of light. Put grab bars in showers and bathtubs. Avoid icy or snowy sidewalks. Wear shoes with sturdy, flat soles. Follow-up care is a key part of your treatment and safety. Be sure to make and go to all appointments, and call your doctor if you are having problems. It's also a good idea to know your test results and keep alist of the medicines you take. When should you call for help? Call 911 anytime you think you may need emergency care. For example, call if:    You passed out (lost consciousness). You have severe trouble breathing. You have sudden chest pain and shortness of breath, or you cough up blood. Call your doctor now or seek immediate medical care if:    Your leg or foot is cool or pale or changes color. You cannot feel or move your leg. You have signs of a blood clot, such as:  Pain in your calf, back of the knee, thigh, or groin. Redness and swelling in your leg or groin. Your incision comes open and begins to bleed, or the bleeding increases.      You feel like your heart is racing or beating irregularly. You have signs of infection, such as: Increased pain, swelling, warmth, or redness. Red streaks leading from the incision. Pus draining from the incision. A fever. Watch closely for any changes in your health, and be sure to contact yourdoctor if:    You do not have a bowel movement after taking a laxative. You do not get better as expected. Where can you learn more? Go to https://gamesGRABRpepiceweb.[employer]. org and sign in to your [employer] account. Enter P198 in the [employer] box to learn more about \"Surgery to Repair a Hip Fracture: What to Expect at Home. \"     If you do not have an account, please click on the \"Sign Up Now\" link. Current as of: March 9, 2022               Content Version: 13.3  © 0942-4502 Healthwise, Incorporated. Care instructions adapted under license by [facility] ([facility]). If you have questions about a medical condition or this instruction, always ask your healthcare professional. [address] any warranty or liability for your use of this information.

## 2022-07-25 NOTE — OP NOTE
[address]                      [facility], [address] [facility]                                OPERATIVE REPORT    PATIENT NAME: Priscilla Castellano                      :        1975  MED REC NO:   [id number]                            ROOM:       H324  ACCOUNT NO:   [de-identified]                           ADMIT DATE: 2022  PROVIDER:     Karan Griffin MD    DATE OF PROCEDURE:  2022    PREOPERATIVE DIAGNOSIS:  Dislocated right total hip arthroplasty. POSTOPERATIVE DIAGNOSIS:  Dislocated right total hip arthroplasty. OPERATION PERFORMED:  Close reduction of right total hip arthroplasty  dislocation. SURGEON:  Karan Griffin MD    ASSISTANT:  Vivi Todd PA-C was present throughout the entire case. Given the nature of the disease process and the procedure, a skilled  surgical first assistant was necessary during the case. The assistant  was necessary to hold retractors and manipulate the extremity during the  procedure. A certified scrub tech was at the back table managing the  instruments and supplies for the surgical case. ANESTHESIA:  LMA. COMPLICATIONS:  None. EBL:  Minimal.    INDICATIONS:  The patient is a 43-year-old female who is three weeks  status post right total hip arthroplasty by Dr. Isra Infante. She was on  the floor and felt the onset of pain and deformity in the hip. She was  found to have a subsequent dislocation. Attempts to reduction in the  emergency room was unsuccessful by the ER staff. As suggested, decision  was made to treat with operative intervention. Risks and benefits of  closed reduction were discussed at length with the patient. These  include infection, stiffness, nerve damage, continued pain and  deformity, as well as need for subsequent operations, including revision  hip replacement. Understanding these options and limitations, she  elected to proceed.   Informed consent was obtained prior to arrival in  the operating room. OPERATIVE PROCEDURE:  Upon arrival, the patient was identified. She was  transported from a stretcher to the operating table and placed in supine  position. An LMA and [place] sedation was administered by the  Anesthesia staff. After appropriate anesthetic, a reduction maneuver  was performed consisting of flexion and gentle rotation. The hip was  easily reduced palpably. AP and lateral images confirmed proper  reduction in all plains without evidence of hardware migration. She was  then awoken from her anesthetic and placed into a brace. She was taken  to the recovery room in stable condition.         Sarah Painter MD    D: 07/24/2022 10:06:27       T: 07/24/2022 11:00:04     [email or web address]  Job#: [id number]     Doc#: [id number]    CC:

## 2022-07-25 NOTE — FLOWSHEET NOTE
Dr Lazarus Dawes in to discharge patient. Discharge instructions given to patient. All belongings taken with her, her mom is here to transport.

## 2022-07-25 NOTE — CARE COORDINATION
SPOKE WITH BOB AT [facility]. REQUESTING PO# FOR BRACE AND FLEXION/EXTENSION/ABDUCTION SETTINGS FOR BRACE FROM DR. PAREDES OUT TO DR. Jimmy Jessica (ON CALL) FOR ORTHO GROUP FOR SETTING ORDER. REP CAN COME TODAY BETWEEN  TO FIT PT FOR BRACE. MANAGEMENT CONTACTED FOR ASSISTANCE IN OBTAINING PO# FOR BRACE ORDER. WILL CONTINUE TO FOLLOW FOR FURTHER NEEDS. MET WITH PT AT BEDSIDE. PT HAS RECEIVED BRACE. STATES NO PT/OT UNTIL FOLLOW UP. DENIES HOME GOING NEEDS. DECLINED [facility] AT [facility].

## 2022-08-04 NOTE — DISCHARGE SUMMARY
Discharge summary  This patient [place] was admitted to the hospital on 7/23/2022  after undergoing Procedure(s) (LRB):  HIP CLOSED REDUCTION (Right) without complications that morning. During the postoperative period,while in hospital, patient was medically managed by the hospitalist. Please see medial notes and H&P for patients additional diagnoses. Ortho agrees with all medical diagnoses and treatments while patient in hospital.  No significant or unexpected findings or abnormal ortho imaging were noted during the hospital stay    Hospital course      Patient tolerated surgical procedure well and there was no complications. Patient progressed adequately through their recovery during hospital stay including PT and rehabilitation. Patient was then D/C on 7/25/2022 to Home  in stable condition. Patient was instructed on the use of pain medications, the signs and symptoms of infection, and was given our number to call should they have any questions or concerns following discharge.         Patient may WBAT to operative extremity with use of walker for assistance with ambulation     Follow up with surgeon in 2 weeks      Secondary diagnoses:   Acute pain secondary to dislocated hip: resolved

## 2024-05-03 ENCOUNTER — APPOINTMENT (OUTPATIENT)
Dept: VASCULAR SURGERY | Facility: CLINIC | Age: 49
End: 2024-05-03
Payer: COMMERCIAL

## 2025-02-03 ENCOUNTER — TELEPHONE (OUTPATIENT)
Dept: VASCULAR SURGERY | Facility: HOSPITAL | Age: 50
End: 2025-02-03
Payer: COMMERCIAL

## 2025-02-03 NOTE — TELEPHONE ENCOUNTER
I have attempted to contact   Ms. Vargas     . There is no answer at the following phone number   [phone]    . I have left a voice mail message for the patient to contact Dr. Jesus's  office nurse at [phone]      Per Dr Jesus, patient to see Ms Nancy Cristopher   Physician Assistant to Dr. Patrice Jesus   for initial consultation for varicose veins.   Parris Holcomb RN BSN

## 2025-02-07 ENCOUNTER — APPOINTMENT (OUTPATIENT)
Dept: VASCULAR SURGERY | Facility: CLINIC | Age: 50
End: 2025-02-07
Payer: COMMERCIAL

## 2025-02-07 ENCOUNTER — OFFICE VISIT (OUTPATIENT)
Dept: VASCULAR SURGERY | Facility: CLINIC | Age: 50
End: 2025-02-07
Payer: COMMERCIAL

## 2025-02-07 VITALS
WEIGHT: 247.2 LBS | HEIGHT: 66 IN | SYSTOLIC BLOOD PRESSURE: 128 MMHG | HEART RATE: 88 BPM | DIASTOLIC BLOOD PRESSURE: 88 MMHG | BODY MASS INDEX: 39.73 KG/M2

## 2025-02-07 DIAGNOSIS — R60.0 EDEMA OF BOTH LOWER LEGS DUE TO PERIPHERAL VENOUS INSUFFICIENCY: Primary | ICD-10-CM

## 2025-02-07 DIAGNOSIS — I87.2 EDEMA OF BOTH LOWER LEGS DUE TO PERIPHERAL VENOUS INSUFFICIENCY: Primary | ICD-10-CM

## 2025-02-07 PROCEDURE — 99204 OFFICE O/P NEW MOD 45 MIN: CPT | Performed by: STUDENT IN AN ORGANIZED HEALTH CARE EDUCATION/TRAINING PROGRAM

## 2025-02-07 PROCEDURE — 3008F BODY MASS INDEX DOCD: CPT | Performed by: STUDENT IN AN ORGANIZED HEALTH CARE EDUCATION/TRAINING PROGRAM

## 2025-02-07 RX ORDER — ACETAMINOPHEN 500 MG
500 TABLET ORAL EVERY 8 HOURS PRN
COMMUNITY

## 2025-02-07 RX ORDER — AMLODIPINE BESYLATE 5 MG/1
5 TABLET ORAL DAILY
COMMUNITY

## 2025-02-07 RX ORDER — ACETAMINOPHEN 500 MG
2000 TABLET ORAL
COMMUNITY
Start: 2024-04-30

## 2025-02-07 NOTE — PROGRESS NOTES
Vascular Surgery Clinic Note    CC: varicose veins    HPI:  Jaki Vargas is a 49 y.o. female presenting for evaluation of venous insufficiency. Patient has had varicose veins since she was 17 years old. Has been having for many years, but it has been getting worse. Symptoms including heaviness, swelling of her lower legs, itchiness, and dry skin of the calves and ankles. Gets worst throughout the day. State right leg symptoms worst than left. She also has pain in the right inner ankle, especially when pressure is applied. She denies any ulcers or wounds. She wears 20-30mmHg compression stockings and tries to elevate her legs.  Denies any history of any blood clots. Has a family history of varicose veins. Quit smoking 6 years ago    Has had previous venous procedures w  over 10 years ago. No [employer] work was performed. Small varicose veins were treated with sclero. Patient feels despite treatment, her varicose veins have returned    Currently on zepbound for weight loss. Has already lost over 40 pounds.     Medical History:   has no past medical history on file.    Meds:   Current Outpatient Medications on File Prior to Visit   Medication Sig Dispense Refill    acetaminophen (Tylenol) 500 mg tablet Take 1 tablet (500 mg) by mouth every 8 hours if needed.      cholecalciferol (Vitamin D-3) 50 mcg (2,000 unit) capsule Take 1 capsule (50 mcg) by mouth once daily.      tirzepatide, weight loss, (Zepbound) 10 mg/0.5 mL injection Inject 10 mg under the skin once a week.      amLODIPine (Norvasc) 5 mg tablet Take 1 tablet (5 mg) by mouth once daily. (Patient not taking: Reported on 2/7/2025)       No current facility-administered medications on file prior to visit.        Allergies:   No Known Allergies    SH:    Social Drivers of Health     Tobacco Use: Medium Risk (12/3/2024)    Received from [facility]    Patient History     Smoking Tobacco Use: Former     Smokeless Tobacco Use: Never     Passive Exposure:  Not on file   Alcohol Use: Patient Declined (3/31/2024)    Received from [facility]    AUDIT-C     Frequency of Alcohol Consumption: Patient declined     Average Number of Drinks: Patient declined     Frequency of Binge Drinking: Patient declined   Financial Resource Strain: Patient Declined (3/31/2024)    Received from [facility]    Overall Financial Resource Strain (CARDIA)     Difficulty of Paying Living Expenses: Patient declined   Food Insecurity: Patient Declined (3/31/2024)    Received from [facility]    Hunger Vital Sign     Worried About Running Out of Food in the Last Year: Patient declined     Ran Out of Food in the Last Year: Patient declined   Transportation Needs: Patient Declined (3/31/2024)    Received from [facility]    PRAPARE - Transportation     Lack of Transportation (Medical): Patient declined     Lack of Transportation (Non-Medical): Patient declined   Physical Activity: Insufficiently Active (5/31/2022)    Received from [facility]    Exercise Vital Sign     Days of Exercise per Week: 1 day     Minutes of Exercise per Session: 10 min   Stress: Stress Concern Present (5/31/2022)    Received from [facility]    [employer] [place] [employer] - Occupational Stress Questionnaire     Feeling of Stress : To some extent   Social Connections: Unknown (3/31/2024)    Received from [facility]    Social Connection and Isolation Panel [NHANES]     Frequency of Communication with Friends and Family: Patient declined     Frequency of Social Gatherings with Friends and Family: Patient declined     Attends Taoist Services: Patient declined     Active Member of Clubs or Organizations: No     Attends Club or Organization Meetings: Patient declined     Marital Status: Patient declined   Intimate Partner Violence: Not on file    Depression: Not at risk (4/26/2024)    Received from [facility]    PHQ-2     PHQ-2 score: 0   Housing Stability: Unknown (3/31/2024)    Received from [facility]    Housing Stability Vital Sign     Unable to Pay for Housing in the Last Year: No     Number of Places Lived in the Last Year: Not on file     Unstable Housing in the Last Year: No   Utilities: Patient Declined (3/31/2024)    Received from [facility] Utilities     Threatened with loss of utilities: Patient declined   Digital Equity: Not on file   Health Literacy: Not on file        FH:  No family history on file.     ROS:  All systems were reviewed and are negative except as per HPI.    Objective:  Vitals:  Vitals:    02/07/25 1520   BP: 128/88   Pulse: 88        Exam:  In NAD, well appearing  Abd Soft, ND/NT  Vascular examination:  Strong pedal pulses   Small varicose veins bilaterally    Assessment & Plan:  Jaki Vargas is 49 y.o. female with history of varicose veins presenting for reevaluation. New prescription for compression stockings were provided since her stockings were old. Advised to continue to lose weight, as that will also help with symptoms. RTC after VI study.      Nancy Nicholas PA-C

## 2025-02-14 ENCOUNTER — TELEPHONE (OUTPATIENT)
Dept: CARDIOLOGY | Facility: CLINIC | Age: 50
End: 2025-02-14
Payer: COMMERCIAL

## 2025-02-14 NOTE — TELEPHONE ENCOUNTER
I have attempted to contact  Ms. Vargas  . There is no answer at the following phone number    [phone]    . I have left a voice mail message for the patient to contact Dr. Jesus's  office nurse at [phone]     Following  review  with Ms Nancy Nicholas  Physician Assistant to Dr. Patrice Jesus   I have been asked to contact the patient  to  schedule a follow up appointment with Dr. Jesus  after  venous insuffiencey studies have been completed.   Parris Holcomb RN BSN

## 2025-02-18 ENCOUNTER — APPOINTMENT (OUTPATIENT)
Dept: CARDIOLOGY | Facility: HOSPITAL | Age: 50
End: 2025-02-18
Payer: COMMERCIAL

## 2025-02-27 ENCOUNTER — APPOINTMENT (OUTPATIENT)
Dept: VASCULAR SURGERY | Facility: CLINIC | Age: 50
End: 2025-02-27
Payer: COMMERCIAL

## 2025-03-31 ENCOUNTER — OFFICE VISIT (OUTPATIENT)
Dept: URGENT CARE | Age: 50
End: 2025-03-31
Payer: COMMERCIAL

## 2025-03-31 VITALS
OXYGEN SATURATION: 97 % | BODY MASS INDEX: 37.28 KG/M2 | HEART RATE: 84 BPM | SYSTOLIC BLOOD PRESSURE: 129 MMHG | HEIGHT: 66 IN | DIASTOLIC BLOOD PRESSURE: 90 MMHG | RESPIRATION RATE: 18 BRPM | WEIGHT: 232 LBS | TEMPERATURE: 98.3 F

## 2025-03-31 DIAGNOSIS — B34.8 RHINOVIRUS INFECTION: Primary | ICD-10-CM

## 2025-03-31 LAB
POC CORONAVIRUS SARS-COV-2 PCR: NEGATIVE
POC HUMAN RHINOVIRUS PCR: POSITIVE
POC INFLUENZA A VIRUS PCR: NEGATIVE
POC INFLUENZA B VIRUS PCR: NEGATIVE
POC RAPID STREP: NEGATIVE
POC RESPIRATORY SYNCYTIAL VIRUS PCR: NEGATIVE

## 2025-03-31 PROCEDURE — 99203 OFFICE O/P NEW LOW 30 MIN: CPT

## 2025-03-31 PROCEDURE — 87631 RESP VIRUS 3-5 TARGETS: CPT

## 2025-03-31 PROCEDURE — 96372 THER/PROPH/DIAG INJ SC/IM: CPT

## 2025-03-31 PROCEDURE — 3008F BODY MASS INDEX DOCD: CPT

## 2025-03-31 PROCEDURE — 87880 STREP A ASSAY W/OPTIC: CPT

## 2025-03-31 RX ADMIN — Medication 10 MG: at 17:24

## 2025-03-31 ASSESSMENT — ENCOUNTER SYMPTOMS: SORE THROAT: 1

## 2025-03-31 ASSESSMENT — PAIN SCALES - GENERAL: PAINLEVEL_OUTOF10: 6

## 2025-03-31 NOTE — PROGRESS NOTES
"Subjective   Patient ID: Jaki Vargas is a 49 y.o. female. They present today with a chief complaint of Sore Throat (today).    History of Present Illness  Subjective  Jaki Vargas is a 49 y.o. female who presents for evaluation of influenza like symptoms. Symptoms include coryza, pain while swallowing, and sore throat and have been present for 1 day. She has tried to alleviate the symptoms with Claritin with no relief. High risk factors for influenza complications: none.    Review of Systems   Constitutional:  Endorses malaise. Denies fever, chills, fatigue  ENT: Denies otalgia, sinus pain/pressure  Respiratory:  Denies cough, sputum production, wheezing, shortness of breath    Cardiovascular:  Denies chest pain, palpitations, syncope, lightheadedness, dizziness.    Gastrointestinal:  Denies abdominal pain, nausea, vomiting, diarrhea.    Integumentary:  Floyd rash.    All other systems are negative          History provided by:  Patient   used: No    Sore Throat         Past Medical History  Allergies as of 03/31/2025    (No Known Allergies)       (Not in a hospital admission)       No past medical history on file.    No past surgical history on file.     reports that she has quit smoking. Her smoking use included cigarettes. She does not have any smokeless tobacco history on file.    Review of Systems  Review of Systems   HENT:  Positive for sore throat.                                   Objective    Vitals:    03/31/25 1612   BP: 129/90   BP Location: Left arm   Patient Position: Sitting   Pulse: 84   Resp: 18   Temp: 36.8 °C (98.3 °F)   TempSrc: Oral   SpO2: 97%   Weight: 105 kg (232 lb)   Height: 1.676 m (5' 6\")     No LMP recorded.    Physical Exam  Vitals and nursing note reviewed.   Constitutional:       General: She is not in acute distress.     Appearance: Normal appearance. She is normal weight. She is ill-appearing. She is not toxic-appearing or diaphoretic.   HENT:      Right Ear: " Tympanic membrane, ear canal and external ear normal. There is no impacted cerumen. Tympanic membrane is not injected, perforated, erythematous or bulging.      Left Ear: Tympanic membrane, ear canal and external ear normal. There is no impacted cerumen. Tympanic membrane is not injected, perforated, erythematous or bulging.      Nose: Congestion and rhinorrhea present.      Right Turbinates: Enlarged and swollen.      Left Turbinates: Enlarged and swollen.      Mouth/Throat:      Mouth: Mucous membranes are moist.      Pharynx: Oropharynx is clear. Postnasal drip present. No pharyngeal swelling, oropharyngeal exudate, posterior oropharyngeal erythema or uvula swelling.      Tonsils: No tonsillar exudate or tonsillar abscesses.   Eyes:      General: No scleral icterus.        Right eye: No discharge.         Left eye: No discharge.      Conjunctiva/sclera: Conjunctivae normal.   Cardiovascular:      Rate and Rhythm: Normal rate and regular rhythm.      Pulses: Normal pulses.      Heart sounds: Normal heart sounds. No murmur heard.     No friction rub. No gallop.   Pulmonary:      Effort: Pulmonary effort is normal. No respiratory distress.      Breath sounds: Normal breath sounds. No stridor. No wheezing, rhonchi or rales.   Musculoskeletal:      Cervical back: Normal range of motion and neck supple. No rigidity or tenderness.   Lymphadenopathy:      Cervical: No cervical adenopathy.   Skin:     General: Skin is warm and dry.      Coloration: Skin is not jaundiced or pale.      Findings: No bruising, erythema or rash.   Neurological:      General: No focal deficit present.      Mental Status: She is alert and oriented to person, place, and time.         Procedures    Point of Care Test & Imaging Results from this visit  No results found for this visit on 03/31/25.   Imaging  No results found.    Cardiology, Vascular, and Other Imaging  No other imaging results found for the past 2 days      Diagnostic study results  (if any) were reviewed by NAYANA Callahan.    Assessment/Plan   Allergies, medications, history, and pertinent labs/EKGs/Imaging reviewed by NAYANA Callahan.     Medical Decision Making    Patient's history and exam consistent with?viral syndrome.? No indications for antibiotics at this time.?[employer] testing?completed.? Rhinovirus test positive. No?evidence of?strep,?pneumonia, otitis, bacterial sinusitis, other bacterial etiology, or sepsis.? Encouraged patient?to continue supportive care measures and symptom management.? Advised to follow-up with primary care provider if?symptoms?persist, or?return with any new or worsening symptoms. Patient agreeable with plan and verbalized understanding.???       Orders and Diagnoses  Diagnoses and all orders for this visit:  Sore throat  -     POCT rapid strep A manually resulted      Medical Admin Record      Patient disposition: Home    Electronically signed by NAYANA Callahan  4:23 PM

## 2025-04-01 ENCOUNTER — APPOINTMENT (OUTPATIENT)
Dept: RADIOLOGY | Facility: HOSPITAL | Age: 50
End: 2025-04-01
Payer: COMMERCIAL

## 2025-04-15 ENCOUNTER — HOSPITAL ENCOUNTER (OUTPATIENT)
Dept: RADIOLOGY | Facility: HOSPITAL | Age: 50
Discharge: HOME | End: 2025-04-15
Payer: COMMERCIAL

## 2025-04-15 DIAGNOSIS — I87.2 EDEMA OF BOTH LOWER LEGS DUE TO PERIPHERAL VENOUS INSUFFICIENCY: ICD-10-CM

## 2025-04-15 DIAGNOSIS — R60.0 EDEMA OF BOTH LOWER LEGS DUE TO PERIPHERAL VENOUS INSUFFICIENCY: ICD-10-CM

## 2025-04-15 DIAGNOSIS — R22.43 LOCALIZED SWELLING, MASS AND LUMP, LOWER LIMB, BILATERAL: ICD-10-CM

## 2025-04-15 PROCEDURE — 93970 EXTREMITY STUDY: CPT | Performed by: INTERNAL MEDICINE

## 2025-04-15 PROCEDURE — 93970 EXTREMITY STUDY: CPT

## 2025-07-31 ENCOUNTER — OFFICE VISIT (OUTPATIENT)
Dept: URGENT CARE | Age: 50
End: 2025-07-31
Payer: COMMERCIAL

## 2025-07-31 VITALS
OXYGEN SATURATION: 98 % | DIASTOLIC BLOOD PRESSURE: 79 MMHG | SYSTOLIC BLOOD PRESSURE: 115 MMHG | TEMPERATURE: 98.6 F | HEART RATE: 86 BPM | RESPIRATION RATE: 20 BRPM

## 2025-07-31 DIAGNOSIS — B02.9: Primary | ICD-10-CM

## 2025-07-31 PROCEDURE — 99213 OFFICE O/P EST LOW 20 MIN: CPT | Performed by: PHYSICIAN ASSISTANT

## 2025-07-31 NOTE — PROGRESS NOTES
Subjective   Patient ID: Jaki Vargas is a 49 y.o. female who presents for Rash (X 4 days seen under L breast spreading to back, scabbing in areas ).  HPI  Patient presents for evaluation of rash.  Patient reports onset of a vesicular slightly tender rash on the left breast and anterior chest 4 days ago.  Patient reports onset of a new or patch on the posterior chest wall.  Patient has been applying hydrocortisone with some relief.  No prior similar incidents.  Patient did have chickenpox as a kid.  No other complaints.    Review of Systems    Constitutional:  See HPI   Integumentary: See HPI  Neurologic:  Alert and oriented X4, No numbness, No tingling.    All other systems are negative     Objective     /79   Pulse 86   Temp 37 °C (98.6 °F)   Resp 20   SpO2 98%     Physical Exam    General:  Alert and oriented, No acute distress.    Eye:  Pupils are equal, round and reactive to light, Normal conjunctiva.    HENT:  Normocephalic,   Neck:  Supple    Respiratory: Respirations are non-labored   Musculoskeletal: Normal ROM and strength  Integumentary: Left breast, anterior chest, and posterior chest wall with vesicular patches; the breast and anterior chest areas are resolving and scabbed; the posterior chest wall is erythematous and vesicular  Neurologic:  Alert, Oriented, Normal sensory, Cranial Nerves II-XII are grossly intact  Psychiatric:  Cooperative, Appropriate mood & affect.    Assessment/Plan   Exam is consistent with zoster in a thoracic dermatome..  Recommend observation for now.  Treatment window for this has passed.  Patient's clinical presentation is otherwise unremarkable at this time. Patient is discharged with instructions to follow-up with primary care or seek emergency medical attention for worsening symptoms or any new concerns.  Problem List Items Addressed This Visit    None  Visit Diagnoses         Herpes zoster involving thoracic dermatome    -  Primary            Final diagnoses:    [B02.9] Herpes zoster involving thoracic dermatome